# Patient Record
Sex: MALE | Race: WHITE
[De-identification: names, ages, dates, MRNs, and addresses within clinical notes are randomized per-mention and may not be internally consistent; named-entity substitution may affect disease eponyms.]

---

## 2020-09-15 ENCOUNTER — HOSPITAL ENCOUNTER (EMERGENCY)
Dept: HOSPITAL 57 - BURERS | Age: 70
Discharge: HOME | End: 2020-09-15
Payer: MEDICARE

## 2020-09-15 DIAGNOSIS — F17.220: ICD-10-CM

## 2020-09-15 DIAGNOSIS — E11.9: ICD-10-CM

## 2020-09-15 DIAGNOSIS — J45.909: ICD-10-CM

## 2020-09-15 DIAGNOSIS — I10: ICD-10-CM
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
## 2020-09-15 DIAGNOSIS — S72.425A: Primary | ICD-10-CM

## 2020-09-15 PROCEDURE — 27510 TREATMENT OF THIGH FRACTURE: CPT

## 2020-09-15 NOTE — RAD
LEFT KNEE FOUR VIEWS:

9/15/20

 

There is a vertical fracture through what appears to be the medial femoral condyle that extends into 
the knee joint. A joint effusion is present as expected. There is joint space narrowing in the joint,
 but particularly medially. The proximal tibia appears intact.

 

IMPRESSION: 

Nondisplaced vertical fracture of the medial femoral condyle. 

 

POS: HOME